# Patient Record
Sex: MALE | Race: WHITE | Employment: FULL TIME | URBAN - METROPOLITAN AREA
[De-identification: names, ages, dates, MRNs, and addresses within clinical notes are randomized per-mention and may not be internally consistent; named-entity substitution may affect disease eponyms.]

---

## 2020-10-26 ENCOUNTER — APPOINTMENT (OUTPATIENT)
Dept: GENERAL RADIOLOGY | Age: 50
End: 2020-10-26
Attending: STUDENT IN AN ORGANIZED HEALTH CARE EDUCATION/TRAINING PROGRAM
Payer: COMMERCIAL

## 2020-10-26 ENCOUNTER — HOSPITAL ENCOUNTER (EMERGENCY)
Age: 50
Discharge: HOME | End: 2020-10-26
Attending: STUDENT IN AN ORGANIZED HEALTH CARE EDUCATION/TRAINING PROGRAM
Payer: COMMERCIAL

## 2020-10-26 ENCOUNTER — ANESTHESIA EVENT (OUTPATIENT)
Dept: SURGERY | Age: 50
End: 2020-10-26
Payer: COMMERCIAL

## 2020-10-26 ENCOUNTER — ANESTHESIA (OUTPATIENT)
Dept: SURGERY | Age: 50
End: 2020-10-26
Payer: COMMERCIAL

## 2020-10-26 ENCOUNTER — APPOINTMENT (OUTPATIENT)
Dept: CT IMAGING | Age: 50
End: 2020-10-26
Attending: STUDENT IN AN ORGANIZED HEALTH CARE EDUCATION/TRAINING PROGRAM
Payer: COMMERCIAL

## 2020-10-26 VITALS
BODY MASS INDEX: 25.76 KG/M2 | TEMPERATURE: 98 F | SYSTOLIC BLOOD PRESSURE: 140 MMHG | DIASTOLIC BLOOD PRESSURE: 67 MMHG | HEART RATE: 86 BPM | OXYGEN SATURATION: 97 % | WEIGHT: 184 LBS | HEIGHT: 71 IN | RESPIRATION RATE: 16 BRPM

## 2020-10-26 DIAGNOSIS — T17.208A FOREIGN BODY IN PHARYNX, INITIAL ENCOUNTER: Primary | ICD-10-CM

## 2020-10-26 DIAGNOSIS — M79.5 FOREIGN BODY (FB) IN SOFT TISSUE: ICD-10-CM

## 2020-10-26 LAB
ALBUMIN SERPL-MCNC: 4 G/DL (ref 3.5–5)
ALBUMIN/GLOB SERPL: 1.1 {RATIO} (ref 1.2–3.5)
ALP SERPL-CCNC: 71 U/L (ref 50–136)
ALT SERPL-CCNC: 23 U/L (ref 12–65)
ANION GAP SERPL CALC-SCNC: 5 MMOL/L (ref 7–16)
AST SERPL-CCNC: 16 U/L (ref 15–37)
BASOPHILS # BLD: 0.1 K/UL (ref 0–0.2)
BASOPHILS NFR BLD: 1 % (ref 0–2)
BILIRUB SERPL-MCNC: 0.5 MG/DL (ref 0.2–1.1)
BUN SERPL-MCNC: 14 MG/DL (ref 6–23)
CALCIUM SERPL-MCNC: 9 MG/DL (ref 8.3–10.4)
CHLORIDE SERPL-SCNC: 104 MMOL/L (ref 98–107)
CO2 SERPL-SCNC: 31 MMOL/L (ref 21–32)
CREAT SERPL-MCNC: 1.07 MG/DL (ref 0.8–1.5)
DIFFERENTIAL METHOD BLD: ABNORMAL
EOSINOPHIL # BLD: 0.2 K/UL (ref 0–0.8)
EOSINOPHIL NFR BLD: 2 % (ref 0.5–7.8)
ERYTHROCYTE [DISTWIDTH] IN BLOOD BY AUTOMATED COUNT: 12.1 % (ref 11.9–14.6)
GLOBULIN SER CALC-MCNC: 3.6 G/DL (ref 2.3–3.5)
GLUCOSE SERPL-MCNC: 84 MG/DL (ref 65–100)
HCT VFR BLD AUTO: 45.6 % (ref 41.1–50.3)
HGB BLD-MCNC: 16 G/DL (ref 13.6–17.2)
IMM GRANULOCYTES # BLD AUTO: 0 K/UL (ref 0–0.5)
IMM GRANULOCYTES NFR BLD AUTO: 0 % (ref 0–5)
LYMPHOCYTES # BLD: 4.1 K/UL (ref 0.5–4.6)
LYMPHOCYTES NFR BLD: 40 % (ref 13–44)
MCH RBC QN AUTO: 32.3 PG (ref 26.1–32.9)
MCHC RBC AUTO-ENTMCNC: 35.1 G/DL (ref 31.4–35)
MCV RBC AUTO: 92.1 FL (ref 79.6–97.8)
MONOCYTES # BLD: 0.8 K/UL (ref 0.1–1.3)
MONOCYTES NFR BLD: 8 % (ref 4–12)
NEUTS SEG # BLD: 5.1 K/UL (ref 1.7–8.2)
NEUTS SEG NFR BLD: 49 % (ref 43–78)
NRBC # BLD: 0 K/UL (ref 0–0.2)
PLATELET # BLD AUTO: 284 K/UL (ref 150–450)
PMV BLD AUTO: 9.1 FL (ref 9.4–12.3)
POTASSIUM SERPL-SCNC: 3.8 MMOL/L (ref 3.5–5.1)
PROT SERPL-MCNC: 7.6 G/DL (ref 6.3–8.2)
RBC # BLD AUTO: 4.95 M/UL (ref 4.23–5.6)
SODIUM SERPL-SCNC: 140 MMOL/L (ref 136–145)
WBC # BLD AUTO: 10.3 K/UL (ref 4.3–11.1)

## 2020-10-26 PROCEDURE — 76210000006 HC OR PH I REC 0.5 TO 1 HR: Performed by: STUDENT IN AN ORGANIZED HEALTH CARE EDUCATION/TRAINING PROGRAM

## 2020-10-26 PROCEDURE — 74011000258 HC RX REV CODE- 258: Performed by: STUDENT IN AN ORGANIZED HEALTH CARE EDUCATION/TRAINING PROGRAM

## 2020-10-26 PROCEDURE — 90471 IMMUNIZATION ADMIN: CPT

## 2020-10-26 PROCEDURE — 77030002996 HC SUT SLK J&J -A: Performed by: STUDENT IN AN ORGANIZED HEALTH CARE EDUCATION/TRAINING PROGRAM

## 2020-10-26 PROCEDURE — 80053 COMPREHEN METABOLIC PANEL: CPT

## 2020-10-26 PROCEDURE — 74011250636 HC RX REV CODE- 250/636: Performed by: STUDENT IN AN ORGANIZED HEALTH CARE EDUCATION/TRAINING PROGRAM

## 2020-10-26 PROCEDURE — 2709999900 HC NON-CHARGEABLE SUPPLY: Performed by: STUDENT IN AN ORGANIZED HEALTH CARE EDUCATION/TRAINING PROGRAM

## 2020-10-26 PROCEDURE — 74011000250 HC RX REV CODE- 250: Performed by: REGISTERED NURSE

## 2020-10-26 PROCEDURE — 74011250636 HC RX REV CODE- 250/636: Performed by: REGISTERED NURSE

## 2020-10-26 PROCEDURE — 70491 CT SOFT TISSUE NECK W/DYE: CPT

## 2020-10-26 PROCEDURE — 77030040922 HC BLNKT HYPOTHRM STRY -A: Performed by: ANESTHESIOLOGY

## 2020-10-26 PROCEDURE — 77030037088 HC TUBE ENDOTRACH ORAL NSL COVD-A: Performed by: ANESTHESIOLOGY

## 2020-10-26 PROCEDURE — 77030021678 HC GLIDESCP STAT DISP VERT -B: Performed by: ANESTHESIOLOGY

## 2020-10-26 PROCEDURE — 76010000131 HC OR TIME 2 TO 2.5 HR: Performed by: STUDENT IN AN ORGANIZED HEALTH CARE EDUCATION/TRAINING PROGRAM

## 2020-10-26 PROCEDURE — 99282 EMERGENCY DEPT VISIT SF MDM: CPT

## 2020-10-26 PROCEDURE — 72020 X-RAY EXAM OF SPINE 1 VIEW: CPT

## 2020-10-26 PROCEDURE — 85025 COMPLETE CBC W/AUTO DIFF WBC: CPT

## 2020-10-26 PROCEDURE — 90715 TDAP VACCINE 7 YRS/> IM: CPT | Performed by: STUDENT IN AN ORGANIZED HEALTH CARE EDUCATION/TRAINING PROGRAM

## 2020-10-26 PROCEDURE — 76060000035 HC ANESTHESIA 2 TO 2.5 HR: Performed by: STUDENT IN AN ORGANIZED HEALTH CARE EDUCATION/TRAINING PROGRAM

## 2020-10-26 PROCEDURE — 74011000636 HC RX REV CODE- 636: Performed by: STUDENT IN AN ORGANIZED HEALTH CARE EDUCATION/TRAINING PROGRAM

## 2020-10-26 RX ORDER — SODIUM CHLORIDE 0.9 % (FLUSH) 0.9 %
10 SYRINGE (ML) INJECTION
Status: DISCONTINUED | OUTPATIENT
Start: 2020-10-26 | End: 2020-10-27 | Stop reason: HOSPADM

## 2020-10-26 RX ORDER — AMOXICILLIN AND CLAVULANATE POTASSIUM 875; 125 MG/1; MG/1
1 TABLET, FILM COATED ORAL 2 TIMES DAILY
Qty: 10 TAB | Refills: 0 | Status: SHIPPED | OUTPATIENT
Start: 2020-10-26 | End: 2020-10-31

## 2020-10-26 RX ORDER — HYDROMORPHONE HYDROCHLORIDE 2 MG/ML
0.5 INJECTION, SOLUTION INTRAMUSCULAR; INTRAVENOUS; SUBCUTANEOUS
Status: DISCONTINUED | OUTPATIENT
Start: 2020-10-26 | End: 2020-10-27 | Stop reason: HOSPADM

## 2020-10-26 RX ORDER — OXYCODONE HYDROCHLORIDE 5 MG/1
5 TABLET ORAL
Status: DISCONTINUED | OUTPATIENT
Start: 2020-10-26 | End: 2020-10-27 | Stop reason: HOSPADM

## 2020-10-26 RX ORDER — ROCURONIUM BROMIDE 10 MG/ML
INJECTION, SOLUTION INTRAVENOUS AS NEEDED
Status: DISCONTINUED | OUTPATIENT
Start: 2020-10-26 | End: 2020-10-26 | Stop reason: HOSPADM

## 2020-10-26 RX ORDER — NEOSTIGMINE METHYLSULFATE 1 MG/ML
INJECTION, SOLUTION INTRAVENOUS AS NEEDED
Status: DISCONTINUED | OUTPATIENT
Start: 2020-10-26 | End: 2020-10-26 | Stop reason: HOSPADM

## 2020-10-26 RX ORDER — CEFAZOLIN SODIUM/WATER 2 G/20 ML
SYRINGE (ML) INTRAVENOUS AS NEEDED
Status: DISCONTINUED | OUTPATIENT
Start: 2020-10-26 | End: 2020-10-26 | Stop reason: HOSPADM

## 2020-10-26 RX ORDER — GLYCOPYRROLATE 0.2 MG/ML
INJECTION INTRAMUSCULAR; INTRAVENOUS AS NEEDED
Status: DISCONTINUED | OUTPATIENT
Start: 2020-10-26 | End: 2020-10-26 | Stop reason: HOSPADM

## 2020-10-26 RX ORDER — SODIUM CHLORIDE, SODIUM LACTATE, POTASSIUM CHLORIDE, CALCIUM CHLORIDE 600; 310; 30; 20 MG/100ML; MG/100ML; MG/100ML; MG/100ML
75 INJECTION, SOLUTION INTRAVENOUS CONTINUOUS
Status: DISCONTINUED | OUTPATIENT
Start: 2020-10-26 | End: 2020-10-27 | Stop reason: HOSPADM

## 2020-10-26 RX ORDER — SODIUM CHLORIDE, SODIUM LACTATE, POTASSIUM CHLORIDE, CALCIUM CHLORIDE 600; 310; 30; 20 MG/100ML; MG/100ML; MG/100ML; MG/100ML
INJECTION, SOLUTION INTRAVENOUS
Status: DISCONTINUED | OUTPATIENT
Start: 2020-10-26 | End: 2020-10-26 | Stop reason: HOSPADM

## 2020-10-26 RX ORDER — HYDROCODONE BITARTRATE AND ACETAMINOPHEN 7.5; 325 MG/15ML; MG/15ML
15 SOLUTION ORAL
Qty: 1 BOTTLE | Refills: 0 | Status: SHIPPED | OUTPATIENT
Start: 2020-10-26 | End: 2020-10-27 | Stop reason: SDUPTHER

## 2020-10-26 RX ORDER — KETOROLAC TROMETHAMINE 30 MG/ML
INJECTION, SOLUTION INTRAMUSCULAR; INTRAVENOUS AS NEEDED
Status: DISCONTINUED | OUTPATIENT
Start: 2020-10-26 | End: 2020-10-26 | Stop reason: HOSPADM

## 2020-10-26 RX ORDER — PROPOFOL 10 MG/ML
INJECTION, EMULSION INTRAVENOUS AS NEEDED
Status: DISCONTINUED | OUTPATIENT
Start: 2020-10-26 | End: 2020-10-26 | Stop reason: HOSPADM

## 2020-10-26 RX ORDER — LIDOCAINE HYDROCHLORIDE 20 MG/ML
INJECTION, SOLUTION EPIDURAL; INFILTRATION; INTRACAUDAL; PERINEURAL AS NEEDED
Status: DISCONTINUED | OUTPATIENT
Start: 2020-10-26 | End: 2020-10-26 | Stop reason: HOSPADM

## 2020-10-26 RX ORDER — DEXAMETHASONE SODIUM PHOSPHATE 4 MG/ML
INJECTION, SOLUTION INTRA-ARTICULAR; INTRALESIONAL; INTRAMUSCULAR; INTRAVENOUS; SOFT TISSUE AS NEEDED
Status: DISCONTINUED | OUTPATIENT
Start: 2020-10-26 | End: 2020-10-26 | Stop reason: HOSPADM

## 2020-10-26 RX ORDER — HYDROCODONE BITARTRATE AND ACETAMINOPHEN 5; 325 MG/1; MG/1
2 TABLET ORAL AS NEEDED
Status: DISCONTINUED | OUTPATIENT
Start: 2020-10-26 | End: 2020-10-27 | Stop reason: HOSPADM

## 2020-10-26 RX ORDER — FENTANYL CITRATE 50 UG/ML
INJECTION, SOLUTION INTRAMUSCULAR; INTRAVENOUS AS NEEDED
Status: DISCONTINUED | OUTPATIENT
Start: 2020-10-26 | End: 2020-10-26 | Stop reason: HOSPADM

## 2020-10-26 RX ADMIN — FENTANYL CITRATE 50 MCG: 50 INJECTION INTRAMUSCULAR; INTRAVENOUS at 20:18

## 2020-10-26 RX ADMIN — GLYCOPYRROLATE 0.6 MG: 0.2 INJECTION, SOLUTION INTRAMUSCULAR; INTRAVENOUS at 21:41

## 2020-10-26 RX ADMIN — FENTANYL CITRATE 50 MCG: 50 INJECTION INTRAMUSCULAR; INTRAVENOUS at 19:57

## 2020-10-26 RX ADMIN — SODIUM CHLORIDE, SODIUM LACTATE, POTASSIUM CHLORIDE, AND CALCIUM CHLORIDE: 600; 310; 30; 20 INJECTION, SOLUTION INTRAVENOUS at 19:51

## 2020-10-26 RX ADMIN — IOPAMIDOL 80 ML: 755 INJECTION, SOLUTION INTRAVENOUS at 18:04

## 2020-10-26 RX ADMIN — Medication 2 G: at 20:07

## 2020-10-26 RX ADMIN — FENTANYL CITRATE 50 MCG: 50 INJECTION INTRAMUSCULAR; INTRAVENOUS at 20:04

## 2020-10-26 RX ADMIN — KETOROLAC TROMETHAMINE 30 MG: 30 INJECTION, SOLUTION INTRAMUSCULAR; INTRAVENOUS at 21:41

## 2020-10-26 RX ADMIN — ROCURONIUM BROMIDE 40 MG: 10 INJECTION, SOLUTION INTRAVENOUS at 19:57

## 2020-10-26 RX ADMIN — SODIUM CHLORIDE 100 ML: 900 INJECTION, SOLUTION INTRAVENOUS at 18:04

## 2020-10-26 RX ADMIN — FENTANYL CITRATE 25 MCG: 50 INJECTION INTRAMUSCULAR; INTRAVENOUS at 20:59

## 2020-10-26 RX ADMIN — FENTANYL CITRATE 25 MCG: 50 INJECTION INTRAMUSCULAR; INTRAVENOUS at 21:05

## 2020-10-26 RX ADMIN — LIDOCAINE HYDROCHLORIDE 100 MG: 20 INJECTION, SOLUTION EPIDURAL; INFILTRATION; INTRACAUDAL; PERINEURAL at 19:57

## 2020-10-26 RX ADMIN — TETANUS TOXOID, REDUCED DIPHTHERIA TOXOID AND ACELLULAR PERTUSSIS VACCINE, ADSORBED 0.5 ML: 5; 2.5; 8; 8; 2.5 SUSPENSION INTRAMUSCULAR at 19:42

## 2020-10-26 RX ADMIN — Medication 4 MG: at 21:41

## 2020-10-26 RX ADMIN — PROPOFOL 200 MG: 10 INJECTION, EMULSION INTRAVENOUS at 19:57

## 2020-10-26 RX ADMIN — DEXAMETHASONE SODIUM PHOSPHATE 10 MG: 4 INJECTION, SOLUTION INTRAMUSCULAR; INTRAVENOUS at 20:21

## 2020-10-26 NOTE — CONSULTS
HPI:  Lele Hdez is a 48 y.o. male seen as a new patient for Foreign Body Swallowed. Patient seen couple hours prior in my clinic for possible foreign body in the oropharynx. 2 days ago patient was at a barbecue where he swallowed meat and had a sudden sensation of sharp metal stabbing to the back of his throat. Symptoms have been progressing for 2 days and he obtained a lateral x-ray earlier today showing possible linear 1-1/2 cm radiolucent foreign body to the oropharynx. My flexible fiberoptic laryngoscope exam was unremarkable for inflammation or foreign body and therefore I sent him to the emergency room at the [facility] location for CT of the neck for further evaluation. Past Medical History, Past Surgical History, Family history, Social History, and Medications were all reviewed with the patient today and updated as necessary. Allergies   Allergen Reactions    Sulfa (Sulfonamide Antibiotics) Rash    Sulfamethoxazole-Trimethoprim Rash       There is no problem list on file for this patient. Current Facility-Administered Medications   Medication Dose Route Frequency    saline peripheral flush soln 10 mL  10 mL InterCATHeter RAD ONCE     No current outpatient medications on file. History reviewed. No pertinent past medical history. Past Surgical History:   Procedure Laterality Date    HX RHINOPLASTY         Social History     Tobacco Use    Smoking status: Never Smoker    Smokeless tobacco: Never Used   Substance Use Topics    Alcohol use: Not Currently       History reviewed. No pertinent family history. ROS:    Review of Systems   Constitutional: Negative for chills and fever. HENT: Positive for sore throat. Negative for hearing loss. Eyes: Negative for blurred vision. Respiratory: Negative for cough. Cardiovascular: Negative for chest pain. Gastrointestinal: Negative for heartburn. Genitourinary: Negative for dysuria.    Musculoskeletal: Negative for myalgias. Skin: Negative for rash. Neurological: Negative for dizziness. Psychiatric/Behavioral: Negative for depression. PHYSICAL EXAM:    Visit Vitals  BP (!) 158/98   Pulse 85   Temp 98.1 °F (36.7 °C)   Resp 16   Ht 5' 11\" (1.803 m)   Wt 184 lb (83.5 kg)   SpO2 99%   BMI 25.66 kg/m²       Physical Exam  Vitals signs and nursing note reviewed. Constitutional:       General: He is awake. He is not in acute distress. Appearance: Normal appearance. He is well-developed and normal weight. He is not ill-appearing or diaphoretic. HENT:      Head: Normocephalic and atraumatic. Jaw: No trismus, tenderness, swelling or pain on movement. Salivary Glands: Right salivary gland is not diffusely enlarged or tender. Left salivary gland is not diffusely enlarged or tender. Right Ear: Tympanic membrane, ear canal and external ear normal. No drainage, swelling or tenderness. No middle ear effusion. There is no impacted cerumen. Tympanic membrane is not perforated. Left Ear: Tympanic membrane, ear canal and external ear normal. No drainage, swelling or tenderness. No middle ear effusion. There is no impacted cerumen. Tympanic membrane is not perforated. Nose: Nose normal. No nasal deformity, nasal tenderness, mucosal edema, congestion or rhinorrhea. Right Nostril: No epistaxis, septal hematoma or occlusion. Left Nostril: No epistaxis, septal hematoma or occlusion. Mouth/Throat:      Lips: No lesions. Mouth: Mucous membranes are moist. No injury or oral lesions. Tongue: No lesions. Tongue does not deviate from midline. Palate: No mass and lesions. Pharynx: Oropharynx is clear. Uvula midline. No pharyngeal swelling, oropharyngeal exudate, posterior oropharyngeal erythema or uvula swelling. Tonsils: No tonsillar exudate or tonsillar abscesses. Eyes:      General: No scleral icterus. Extraocular Movements: Extraocular movements intact. Conjunctiva/sclera: Conjunctivae normal.      Pupils: Pupils are equal, round, and reactive to light. Neck:      Musculoskeletal: Normal range of motion and neck supple. No edema, erythema or neck rigidity. Thyroid: No thyroid mass or thyromegaly. Trachea: Trachea and phonation normal. No tracheal tenderness. Pulmonary:      Effort: Pulmonary effort is normal. No tachypnea. Breath sounds: No stridor. Lymphadenopathy:      Head:      Right side of head: No submental, submandibular, preauricular or posterior auricular adenopathy. Left side of head: No submental, submandibular, preauricular or posterior auricular adenopathy. Cervical: No cervical adenopathy. Right cervical: No superficial, deep or posterior cervical adenopathy. Left cervical: No superficial, deep or posterior cervical adenopathy. Skin:     General: Skin is warm and dry. Neurological:      Mental Status: He is alert and oriented to person, place, and time. Cranial Nerves: Cranial nerves are intact. No facial asymmetry. Psychiatric:         Mood and Affect: Mood and affect normal.         Behavior: Behavior normal.       PROCEDURE: Flexible laryngoscopy  INDICATIONS: Throat pain, possible pharyngeal foreign body  DESCRIPTION: After verbal consent was obtained and a timeout was performed, the flexible scope was advanced down one of the patient's nares into the nasopharynx. The nasal cavity and nasopharynx were clear. The scope was then turned distally down towards the oropharynx. The BOT and vallecula were clear and the epiglottis was normal in appearance. Both aryepiglottic folds were clear and there was a normal appearing glottis w/ healthy TVCs. No nodules or polyps and the cords were fully mobile. Airway widely patent. No concerning lesions seen along post-cricoid region or within either piriform sinus. The posterior pharyngeal was clear as well.   There is no noticeable foreign body to base of tongue or vallecula or to the piriform sinuses or remaining supraglottic structures. No evidence of recent trauma without edema or erythema. The scope was then carefully removed. The patient tolerated the procedure well and there were no complications. CT NECK WITH CONTRAST, 10/26/2020.     CLINICAL HISTORY: Swallowed something sharp while eating food off of the grill  on Saturday. Aphagia.     Technique: Multiple contiguous helical CT images were obtained from the frontal  sinuses through the lung apices following the uneventful intravenous  administration of 80 mL of Isovue-370. All CT scans performed at this facility  use one or all of the following: Automated exposure control, adjustment of the  mA and/or kVp according to patient's size, iterative reconstruction.     COMPARISON STUDY: None.     FINDINGS:     An abnormal wire-like density is seen in the floor of mouth soft tissues just to  the left of midline and just anterior to the vallecular level of the hypopharynx  best appreciated on axial image 57, and sagittal reconstructed image 46. This  could either represent a calcified bone, or an additional wire-like structure  such as a brush wire. No abnormal soft tissue gas or fluid collection is seen at  this time.     Limited imaging of the upper chest shows no significant abnormalities.     IMPRESSION  IMPRESSION:  1. Abnormal wire-like structure seen in the floor of mouth soft tissues slightly  to the left of midline and just anterior to the vallecular level of the  hypopharynx. The appearance is concerning for foreign body such as a brush wire,  or a potential calcified bone. ASSESSMENT and PLAN    1.  Foreign body in pharynx, initial encounter  -CT of soft tissue neck with contrast in the ER does reveal likely linear foreign body that is at the midline and appears to be wedged into the soft tissue of the vallecula and base of tongue.  -Patient will urgently be added on for direct laryngoscopy and removal of oral pharyngeal foreign body.  -I discussed all the risks of laryngoscopy including bleeding, pharyngeal perforation, hoarseness, damage to teeth/lips/gums, and need for further procedures and he would like to proceed.          Piedad Vasquez,   10/26/2020

## 2020-10-26 NOTE — ANESTHESIA PREPROCEDURE EVALUATION
Relevant Problems   No relevant active problems       Anesthetic History   No history of anesthetic complications            Review of Systems / Medical History  Patient summary reviewed and pertinent labs reviewed    Pulmonary  Within defined limits                 Neuro/Psych   Within defined limits           Cardiovascular  Within defined limits                Exercise tolerance: >4 METS     GI/Hepatic/Renal               Comments: FB in throat Endo/Other  Within defined limits           Other Findings              Physical Exam    Airway  Mallampati: II  TM Distance: 4 - 6 cm  Neck ROM: normal range of motion   Mouth opening: Normal     Cardiovascular  Regular rate and rhythm,  S1 and S2 normal,  no murmur, click, rub, or gallop             Dental         Pulmonary  Breath sounds clear to auscultation               Abdominal         Other Findings            Anesthetic Plan    ASA: 2, emergent  Anesthesia type: general          Induction: Intravenous  Anesthetic plan and risks discussed with: Patient

## 2020-10-27 NOTE — OP NOTES
[place]  OPERATIVE REPORT    Name:  Destini Feliciano  MR#:  [id number]  :  1970  ACCOUNT #:  [de-identified]  DATE OF SERVICE:  10/26/2020    PREOPERATIVE DIAGNOSIS:  Oropharyngeal foreign body. POSTOPERATIVE DIAGNOSIS:  Oropharyngeal foreign body. PROCEDURE PERFORMED:  Direct laryngoscopy. SURGEON:  Silvano Beckett DO    ASSISTANT:  None. ANESTHESIA:  General anesthesia with endotracheal tube. COMPLICATIONS:  None. SPECIMENS REMOVED:  none. IMPLANTS:  none. ESTIMATED BLOOD LOSS:  Minimal.    DISPOSITION:  Stable to PACU. INDICATIONS FOR PROCEDURE:  This is a 70-year-old male who was at a [facility] in [place] on Saturday and upon having his first bite of a sausage, he had a sharp sudden stabbing sensation to the back of his throat that felt as though he has swallowed a sharp piece of metal.  It felt stuck in the back of his throat and continued to have a sharp sensation each time that he tried to swallow. He decided to drive home that evening back to [place] where he could see a physician in his hometown. He felt slightly better Saturday evening and, therefore, delayed being seen until Monday morning as his throat pain did not clear up. He underwent an x-ray, which showed likely foreign body in the anterior portion of his neck and then a followup CT scan of the neck revealing a linear radiopaque foreign body with the appearance of being lodged into the soft tissue at the base of the tongue anterior to the vallecula. He was seen in my office where a flexible laryngoscopy showed no evidence of foreign body and no evidence of recent trauma or inflammation. Decision was then made to have him scheduled urgently that evening for operating room to undergo examination with direct laryngoscopy with goal of removal of foreign body. DESCRIPTION OF PROCEDURE:  The patient was brought from the emergency room to the operating room.   He was laid supine on the operating table by the anesthesia team.  General anesthesia was induced and endotracheal tube was placed. A time-out was then performed. He was then prepped and draped in the usual sterile fashion. Head was placed into a sniffing position with flexion of the neck and extension of the head. A direct Pilling laryngoscope was then atraumatically inserted into the oropharynx, mouthguard was placed over the maxillary teeth for protection of the teeth, and the laryngoscope was slowly advanced down to the level of the base of the tongue towards the vallecula. Upon examination, there were no obvious signs of trauma or inflammation to the vallecular area. Intraoperative lateral x-ray films were obtained during the case to help navigate towards the area of the foreign body. A metal probe was placed during these x-rays for zeroing in and showed that the foreign body was a couple of millimeters anterior to the probe into the soft tissue component of the base of the tongue fully encased by soft tissue. Several midline incisions were performed in this area with minimal bloody ooze but unfortunately with no visualization and identification of the foreign body. Several locations were incised and tongue tissue was spread, but still unfortunately no visualization or identification of the foreign body. Therefore, at this time, pledgets soaked in adrenaline were placed to the areas that were incised and hemostasis was satisfactorily achieved. The direct laryngoscope was removed from the oral cavity and the care of the patient was transferred back over to Anesthesia where he awoke from anesthesia with no complications and was extubated successfully. He was then transferred to PACU in stable condition.       [address] [place] [address]      [id number]  D:  10/26/2020 22:38  T:  10/27/2020 8:41  JOB #:  [id number]

## 2020-10-27 NOTE — ED PROVIDER NOTES
60-year-old male patient presents with reports of throat pain after ingesting a suspected foreign body approximate 2 days ago. Patient states he was at a barbecue when he ate a piece of sausage and felt immediate discomfort in the posterior aspect of his throat. He reports foreign body sensation lasting for the remainder of that day though slightly improved upon arriving to a local emergency department. He was subsequently referred to an ENT specialist which she followed up with today. The specialist called this department prior to his arrival requesting CT imaging of the throat for suspected foreign body. Patient underwent oropharyngeal scope in the department without clear visualization of this foreign body. Patient is unsure of what this object may be. He denies any nausea, vomiting, hemoptysis or Aleta emesis. History reviewed. No pertinent past medical history. Past Surgical History:   Procedure Laterality Date    HX RHINOPLASTY           History reviewed. No pertinent family history.     Social History     Socioeconomic History    Marital status:      Spouse name: Not on file    Number of children: Not on file    Years of education: Not on file    Highest education level: Not on file   Occupational History    Not on file   Social Needs    Financial resource strain: Not on file    Food insecurity     Worry: Not on file     Inability: Not on file    Transportation needs     Medical: Not on file     Non-medical: Not on file   Tobacco Use    Smoking status: Never Smoker    Smokeless tobacco: Never Used   Substance and Sexual Activity    Alcohol use: Not Currently    Drug use: Never    Sexual activity: Not on file   Lifestyle    Physical activity     Days per week: Not on file     Minutes per session: Not on file    Stress: Not on file   Relationships    Social connections     Talks on phone: Not on file     Gets together: Not on file     Attends Confucianist service: Not on file     Active member of club or organization: Not on file     Attends meetings of clubs or organizations: Not on file     Relationship status: Not on file    Intimate partner violence     Fear of current or ex partner: Not on file     Emotionally abused: Not on file     Physically abused: Not on file     Forced sexual activity: Not on file   Other Topics Concern    Not on file   Social History Narrative    Not on file         ALLERGIES: Sulfa (sulfonamide antibiotics) and Sulfamethoxazole-trimethoprim    Review of Systems   Constitutional: Negative for chills, diaphoresis and fever. HENT: Positive for sore throat. Negative for congestion and sneezing. Eyes: Negative for visual disturbance. Respiratory: Negative for cough, chest tightness, shortness of breath and wheezing. Cardiovascular: Negative for chest pain and leg swelling. Gastrointestinal: Negative for abdominal pain, blood in stool, diarrhea, nausea and vomiting. Endocrine: Negative for polyuria. Genitourinary: Negative for difficulty urinating, dysuria, flank pain, hematuria and urgency. Musculoskeletal: Negative for back pain, myalgias, neck pain and neck stiffness. Skin: Negative for color change and rash. Neurological: Negative for dizziness, syncope, speech difficulty, weakness, light-headedness, numbness and headaches. Psychiatric/Behavioral: Negative for behavioral problems. All other systems reviewed and are negative. Vitals:    10/26/20 2201 10/26/20 2206 10/26/20 2210 10/26/20 2226   BP: (!) 141/73 137/69 137/69 (!) 140/67   Pulse: 89 89 86 86   Resp: 16 16 16 16   Temp:       SpO2: 94% 95% 98% 97%   Weight:       Height:                Physical Exam  Vitals signs and nursing note reviewed. Constitutional:       General: He is not in acute distress. Appearance: He is well-developed. He is not diaphoretic. Comments: Alert and oriented to person place and time.   No acute distress, speaks in clear, fluid sentences. HENT:      Head: Normocephalic and atraumatic. Right Ear: External ear normal.      Left Ear: External ear normal.      Nose: Nose normal.   Eyes:      Pupils: Pupils are equal, round, and reactive to light. Neck:      Musculoskeletal: Normal range of motion. Cardiovascular:      Rate and Rhythm: Normal rate and regular rhythm. Heart sounds: Normal heart sounds. No murmur. No friction rub. No gallop. Pulmonary:      Effort: Pulmonary effort is normal. No respiratory distress. Breath sounds: Normal breath sounds. No stridor. No decreased breath sounds, wheezing, rhonchi or rales. Chest:      Chest wall: No tenderness. Abdominal:      General: There is no distension. Palpations: Abdomen is soft. There is no mass. Tenderness: There is no abdominal tenderness. There is no guarding or rebound. Hernia: No hernia is present. Musculoskeletal: Normal range of motion. General: No tenderness or deformity. Skin:     General: Skin is warm and dry. Neurological:      Mental Status: He is alert and oriented to person, place, and time. Cranial Nerves: No cranial nerve deficit. MDM  Number of Diagnoses or Management Options  Foreign body in pharynx, initial encounter: new and does not require workup  Diagnosis management comments: CT imaging shows evidence of foreign body in the posterior oropharynx lodged in the soft tissue behind the tongue. ENT is aware of this finding and has schedule patient for surgery at this time. Labs are normal.  Patient's tetanus status has been updated. Patient aware plan of care. Voice dictation software was used during the making of this note. This software is not perfect and grammatical and other typographical errors may be present. This note has been proofread, but may still contain errors.   [address] Doctor Kevin Fernandez [facility]; 10/26/2020 @11:17 PM   =================================================================== Amount and/or Complexity of Data Reviewed  Clinical lab tests: ordered and reviewed  Tests in the radiology section of CPT®: ordered and reviewed  Tests in the medicine section of CPT®: ordered and reviewed  Discuss the patient with other providers: yes  Independent visualization of images, tracings, or specimens: yes    Risk of Complications, Morbidity, and/or Mortality  Presenting problems: high  Diagnostic procedures: high  Management options: high    Patient Progress  Patient progress: stable         Procedures

## 2020-10-27 NOTE — DISCHARGE INSTRUCTIONS
Patient Education        Swallowed Object in Throat or Esophagus: Care Instructions  Your Care Instructions  When you swallow food, liquid, or an object, it passes from your mouth and goes down your throat and esophagus and into your stomach. But sometimes these things can get stuck in your throat or esophagus. This may make you choke, cough, or gag. Some objects can cause more problems than others. Sharp, long, or large objects can scratch or cut your throat, your esophagus, and your stomach if they get stuck or if they are swallowed. When this happens, these areas can bleed or get infected. If the object was stuck in your throat or esophagus, your doctor probably removed it. If you swallowed the object, your doctor may have suggested that you wait and see if the object comes out in your stool. Most swallowed objects will pass through your body without any problem and show up in your stool within 3 days. If the object does not show up in your stool within 7 days, your doctor may order tests to find out where it is in your body. Your throat may feel sore after you have had an object removed or have swallowed an object that has scratched your throat. It may hurt for a few days when you eat or swallow. The scratch itself may make it feel as if something is still stuck in your throat. Follow-up care is a key part of your treatment and safety. Be sure to make and go to all appointments, and call your doctor if you are having problems. It is also a good idea to know your test results and keep a list of the medicines you take. How can you care for yourself at home? · Take pain medicines exactly as directed. ? If the doctor gave you a prescription medicine for pain, take it as prescribed. ? If you are not taking a prescription pain medicine, ask your doctor if you can take an over-the-counter medicine. · If your doctor prescribed antibiotics, take them as directed.  Do not stop taking them just because you feel better. You need to take the full course of antibiotics. · Drink liquids. If swallowing liquids is easy, try eating soft foods like bread or bananas. If these foods are easy to swallow, start to add other foods. · Avoid very hot or very cold foods. · If you swallowed an object and it has passed through to your stomach, try eating foods that are high in fiber, such as fruits, vegetables, and whole grains. These foods may help you pass the object more quickly. · Watch your stools to see if the object has passed. Do not use a laxative unless your doctor says that it is okay. · Do not smoke. Smoking can irritate your throat and your esophagus even more. If you need help quitting, talk to your doctor about stop-smoking programs and medicines. These can increase your chances of quitting for good. To prevent swallowing objects or choking:  · Cut food into small pieces. · Eat slowly, take small bites, and chew your food all the way. · Do not laugh or talk with food in your mouth. · Do not eat or drink while you are doing something else, such as when you drive. · Do not hold objects, such as pins, nails, or toothpicks, in your mouth or between your lips. · Limit how much alcohol you drink while you eat. When should you call for help? Call 911 anytime you think you may need emergency care. For example, call if:    · You have chest pain.     · You vomit a large amount of blood or what looks like coffee grounds.     · You have severe stomach pain.     · You pass maroon or very bloody stools.     · You can't swallow.     · You have severe trouble breathing. Call your doctor now or seek immediate medical care if:    · You have any stomach pain.     · You have signs of an infection, such as:  ? Pain, swelling, or tenderness in or around your throat, neck, chest, or belly. ? A fever. ? A cough.   ? Shortness of breath.     · You vomit a small amount of blood or what looks like coffee grounds.     · You have trouble breathing.     · You have trouble swallowing.     · You vomited more than one time since you had an object removed from your throat or esophagus or since you swallowed an object.     · Your stools are black and tarlike or have streaks of blood. Watch closely for changes in your health, and be sure to contact your doctor if:    · You still feel like you have something stuck in your throat or esophagus.     · You do not get better as expected. Where can you learn more? Go to http://www.gray.com/  Enter Q282 in the search box to learn more about \"Swallowed Object in Throat or Esophagus: Care Instructions. \"  Current as of: June 26, 2019               Content Version: 12.6  © 0834-3532 [employer]. Care instructions adapted under license by [employer] (which disclaims liability or warranty for this information). If you have questions about a medical condition or this instruction, always ask your healthcare professional. [place] [address] any warranty or liability for your use of this information. Soft diet for 48 hrs  An antibiotic has been sent to your pharmacy. You can pick that up in the morning and begin taking it    Please call [facility] or go to the emergency room if you experience any shortness of breath, tongue swelling, fever or chills, bleeding or drainage from the tongue. We will keep in touch with you and will probably have you get a repeat xray on Wednesday or Thursday. Patient Education        Swallowed Object in Throat or Esophagus: Care Instructions  Your Care Instructions  When you swallow food, liquid, or an object, it passes from your mouth and goes down your throat and esophagus and into your stomach. But sometimes these things can get stuck in your throat or esophagus. This may make you choke, cough, or gag. Some objects can cause more problems than others.  Sharp, long, or large objects can scratch or cut your throat, your esophagus, and your stomach if they get stuck or if they are swallowed. When this happens, these areas can bleed or get infected. If the object was stuck in your throat or esophagus, your doctor probably removed it. If you swallowed the object, your doctor may have suggested that you wait and see if the object comes out in your stool. Most swallowed objects will pass through your body without any problem and show up in your stool within 3 days. If the object does not show up in your stool within 7 days, your doctor may order tests to find out where it is in your body. Your throat may feel sore after you have had an object removed or have swallowed an object that has scratched your throat. It may hurt for a few days when you eat or swallow. The scratch itself may make it feel as if something is still stuck in your throat. Follow-up care is a key part of your treatment and safety. Be sure to make and go to all appointments, and call your doctor if you are having problems. It is also a good idea to know your test results and keep a list of the medicines you take. How can you care for yourself at home? · Take pain medicines exactly as directed. ? If the doctor gave you a prescription medicine for pain, take it as prescribed. ? If you are not taking a prescription pain medicine, ask your doctor if you can take an over-the-counter medicine. · If your doctor prescribed antibiotics, take them as directed. Do not stop taking them just because you feel better. You need to take the full course of antibiotics. · Drink liquids. If swallowing liquids is easy, try eating soft foods like bread or bananas. If these foods are easy to swallow, start to add other foods. · Avoid very hot or very cold foods. · If you swallowed an object and it has passed through to your stomach, try eating foods that are high in fiber, such as fruits, vegetables, and whole grains.  These foods may help you pass the object more quickly. · Watch your stools to see if the object has passed. Do not use a laxative unless your doctor says that it is okay. · Do not smoke. Smoking can irritate your throat and your esophagus even more. If you need help quitting, talk to your doctor about stop-smoking programs and medicines. These can increase your chances of quitting for good. To prevent swallowing objects or choking:  · Cut food into small pieces. · Eat slowly, take small bites, and chew your food all the way. · Do not laugh or talk with food in your mouth. · Do not eat or drink while you are doing something else, such as when you drive. · Do not hold objects, such as pins, nails, or toothpicks, in your mouth or between your lips. · Limit how much alcohol you drink while you eat. When should you call for help? Call 911 anytime you think you may need emergency care. For example, call if:    · You have chest pain.     · You vomit a large amount of blood or what looks like coffee grounds.     · You have severe stomach pain.     · You pass maroon or very bloody stools.     · You can't swallow.     · You have severe trouble breathing. Call your doctor now or seek immediate medical care if:    · You have any stomach pain.     · You have signs of an infection, such as:  ? Pain, swelling, or tenderness in or around your throat, neck, chest, or belly. ? A fever. ? A cough. ? Shortness of breath.     · You vomit a small amount of blood or what looks like coffee grounds.     · You have trouble breathing.     · You have trouble swallowing.     · You vomited more than one time since you had an object removed from your throat or esophagus or since you swallowed an object.     · Your stools are black and tarlike or have streaks of blood. Watch closely for changes in your health, and be sure to contact your doctor if:    · You still feel like you have something stuck in your throat or esophagus.     · You do not get better as expected.    Where can you learn more? Go to http://www.gray.com/  Enter Q282 in the search box to learn more about \"Swallowed Object in Throat or Esophagus: Care Instructions. \"  Current as of: June 26, 2019               Content Version: 12.6  © 0915-6922 [employer]. Care instructions adapted under license by [employer] (which disclaims liability or warranty for this information). If you have questions about a medical condition or this instruction, always ask your healthcare professional. [address] any warranty or liability for your use of this information.

## 2020-10-27 NOTE — ANESTHESIA POSTPROCEDURE EVALUATION
Procedure(s):  ESOPHAGOSCOPY FLEXIBLE.     general    Anesthesia Post Evaluation      Multimodal analgesia: multimodal analgesia used between 6 hours prior to anesthesia start to PACU discharge  Patient location during evaluation: PACU  Patient participation: complete - patient participated  Level of consciousness: awake and alert  Pain score: 3  Pain management: adequate  Airway patency: patent  Anesthetic complications: no  Cardiovascular status: acceptable and hemodynamically stable  Respiratory status: acceptable  Hydration status: acceptable  Post anesthesia nausea and vomiting:  none  Final Post Anesthesia Temperature Assessment:  Normothermia (36.0-37.5 degrees C)      INITIAL Post-op Vital signs:   Vitals Value Taken Time   /69 10/26/2020 10:10 PM   Temp 36.7 °C (98 °F) 10/26/2020  9:56 PM   Pulse 86 10/26/2020 10:10 PM   Resp 16 10/26/2020 10:10 PM   SpO2 98 % 10/26/2020 10:10 PM

## 2020-10-27 NOTE — BRIEF OP NOTE
Brief Postoperative Note    Patient: Danny Fu  YOB: 1970  MRN: [id number]    Date of Procedure: 10/26/2020     Pre-Op Diagnosis: oropharyngeal foreign body    Post-Op Diagnosis: Same as preoperative diagnosis. Procedure(s):  Direct laryngoscopy     Surgeon(s):  Nikolas Tello DO    Surgical Assistant: None    Anesthesia: General     Estimated Blood Loss (mL): Minimal    Complications: None    Specimens: * No specimens in log *     Implants: * No implants in log *    Drains: * No LDAs found *    Findings: FB was deep to base of tongue tissue on intra-operative lateral xrays. Not able to properly dissect down to level of FB.      Electronically Signed by Ralph Dave DO on 10/26/2020 at 10:04 PM

## 2020-10-28 ENCOUNTER — HOSPITAL ENCOUNTER (OUTPATIENT)
Dept: GENERAL RADIOLOGY | Age: 50
Discharge: HOME | End: 2020-10-28
Attending: STUDENT IN AN ORGANIZED HEALTH CARE EDUCATION/TRAINING PROGRAM
Payer: COMMERCIAL

## 2020-10-28 DIAGNOSIS — T17.208D FOREIGN BODY IN PHARYNX, SUBSEQUENT ENCOUNTER: ICD-10-CM

## 2020-10-28 PROCEDURE — 72040 X-RAY EXAM NECK SPINE 2-3 VW: CPT

## (undated) DEVICE — PACKING 8004003 NEURAY 200PK 25X25MM: Brand: NEURAY ®

## (undated) DEVICE — KIT PROCEDURE SURG HEAD AND NECK TOTE

## (undated) DEVICE — JELLY LUBRICATING 10GM PREFIL SYR LUBE

## (undated) DEVICE — SUT SLK 2-0SH 30IN BLK --

## (undated) DEVICE — KIT,ANTI FOG,W/SPONGE & FLUID,SOFT PACK: Brand: MEDLINE

## (undated) DEVICE — DRAPE XR C ARM 41X74IN LF --